# Patient Record
Sex: MALE | URBAN - METROPOLITAN AREA
[De-identification: names, ages, dates, MRNs, and addresses within clinical notes are randomized per-mention and may not be internally consistent; named-entity substitution may affect disease eponyms.]

---

## 2024-10-23 ENCOUNTER — ANCILLARY PROCEDURE (OUTPATIENT)
Dept: URGENT CARE | Age: 24
End: 2024-10-23
Payer: COMMERCIAL

## 2024-10-23 ENCOUNTER — OFFICE VISIT (OUTPATIENT)
Dept: URGENT CARE | Age: 24
End: 2024-10-23
Payer: COMMERCIAL

## 2024-10-23 VITALS
DIASTOLIC BLOOD PRESSURE: 86 MMHG | SYSTOLIC BLOOD PRESSURE: 138 MMHG | OXYGEN SATURATION: 95 % | WEIGHT: 280 LBS | HEART RATE: 109 BPM | TEMPERATURE: 97.7 F | RESPIRATION RATE: 17 BRPM

## 2024-10-23 DIAGNOSIS — M79.645 PAIN OF LEFT THUMB: ICD-10-CM

## 2024-10-23 DIAGNOSIS — M77.8 LEFT HAND TENDONITIS: Primary | ICD-10-CM

## 2024-10-23 ASSESSMENT — ENCOUNTER SYMPTOMS
SHORTNESS OF BREATH: 0
FATIGUE: 0
NUMBNESS: 0
JOINT SWELLING: 0
FEVER: 0
SINUS PAIN: 0
EYE ITCHING: 0
CHEST TIGHTNESS: 0
COUGH: 0
ABDOMINAL PAIN: 0
SORE THROAT: 0
WEAKNESS: 0
CHILLS: 0
RHINORRHEA: 0
NAUSEA: 0
EYE REDNESS: 0
VOMITING: 0
EYE DISCHARGE: 0
DIARRHEA: 0
EYE PAIN: 0
ARTHRALGIAS: 1

## 2024-10-23 NOTE — PROGRESS NOTES
Subjective   Patient ID: Neil Jaffe is a 24 y.o. male. They present today with a chief complaint of thumb pain (Left thumb pain with no known injury).    History of Present Illness  Pt presented with 2days acute left thumb pain. Reports pain with movement and denies injury. Uses hands a lot for work. Denies weakness, change of sensation          Past Medical History  Allergies as of 10/23/2024    (No Known Allergies)       (Not in a hospital admission)       History reviewed. No pertinent past medical history.    History reviewed. No pertinent surgical history.     reports that he has quit smoking. His smoking use included cigarettes. He has never used smokeless tobacco.    Review of Systems  Review of Systems   Constitutional:  Negative for chills, fatigue and fever.   HENT:  Negative for ear discharge, ear pain, rhinorrhea, sinus pain, sneezing and sore throat.    Eyes:  Negative for pain, discharge, redness and itching.   Respiratory:  Negative for cough, chest tightness and shortness of breath.    Cardiovascular:  Negative for chest pain.   Gastrointestinal:  Negative for abdominal pain, diarrhea, nausea and vomiting.   Musculoskeletal:  Positive for arthralgias. Negative for joint swelling.   Skin:  Negative for rash.   Neurological:  Negative for weakness and numbness.                                  Objective    Vitals:    10/23/24 1639   BP: 138/86   Pulse: 109   Resp: 17   Temp: 36.5 °C (97.7 °F)   SpO2: 95%   Weight: 127 kg (280 lb)     No LMP for male patient.    Physical Exam  Constitutional:       Appearance: Normal appearance.   HENT:      Head: Normocephalic and atraumatic.   Cardiovascular:      Rate and Rhythm: Normal rate and regular rhythm.      Heart sounds: No murmur heard.  Pulmonary:      Effort: Pulmonary effort is normal. No respiratory distress.      Breath sounds: No stridor. No wheezing, rhonchi or rales.   Musculoskeletal:         General: No swelling.      Comments: Tenderness along  the left MCP joint w/o deformity or swelling   Skin:     General: Skin is warm and dry.      Findings: No bruising, erythema, lesion or rash.   Neurological:      Mental Status: He is alert and oriented to person, place, and time.   Psychiatric:         Mood and Affect: Mood normal.         Procedures    Point of Care Test & Imaging Results from this visit  No results found for this visit on 10/23/24.   XR fingers left 2+ views    Result Date: 10/23/2024  Interpreted By:  Wilfredo Gallardo, STUDY: XR FINGERS LEFT 2+ VIEWS; ;  10/23/2024 4:50 pm   INDICATION: Signs/Symptoms:pain.   ,M79.645 Pain in left finger(s)   COMPARISON: None.   ACCESSION NUMBER(S): [id number]   ORDERING CLINICIAN: LIMA ARIAS   FINDINGS: No acute fracture or dislocation. The joint spaces are maintained. The soft tissues are unremarkable.       No acute fracture or dislocation.     MACRO: None   Signed by: Wilfredo Gallardo 10/23/2024 4:53 PM Dictation workstation:   [id number]     Diagnostic study results (if any) were reviewed by Lima Arias PA-C.    Assessment/Plan   Allergies, medications, history, and pertinent labs/EKGs/Imaging reviewed by Lima Arias PA-C.     Medical Decision Making  X-ray no acute findings, sesamoid bone   Possible tendonitis    Orders and Diagnoses  Diagnoses and all orders for this visit:  Left hand tendonitis  Pain of left thumb  -     XR fingers left 2+ views; Future      Medical Admin Record      Patient disposition: Home    Electronically signed by Lima Arias PA-C  5:14 PM